# Patient Record
Sex: FEMALE | Race: WHITE | NOT HISPANIC OR LATINO | URBAN - METROPOLITAN AREA
[De-identification: names, ages, dates, MRNs, and addresses within clinical notes are randomized per-mention and may not be internally consistent; named-entity substitution may affect disease eponyms.]

---

## 2023-12-15 ENCOUNTER — EMERGENCY (EMERGENCY)
Facility: HOSPITAL | Age: 28
LOS: 1 days | Discharge: HOME | End: 2023-12-15
Admitting: STUDENT IN AN ORGANIZED HEALTH CARE EDUCATION/TRAINING PROGRAM
Payer: COMMERCIAL

## 2023-12-15 VITALS
HEART RATE: 93 BPM | RESPIRATION RATE: 20 BRPM | SYSTOLIC BLOOD PRESSURE: 133 MMHG | DIASTOLIC BLOOD PRESSURE: 87 MMHG | OXYGEN SATURATION: 98 % | TEMPERATURE: 98 F
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
## 2023-12-15 DIAGNOSIS — S81.811A LACERATION WITHOUT FOREIGN BODY, RIGHT LOWER LEG, INITIAL ENCOUNTER: ICD-10-CM

## 2023-12-15 PROCEDURE — 12004 RPR S/N/AX/GEN/TRK7.6-12.5CM: CPT

## 2023-12-15 PROCEDURE — 99284 EMERGENCY DEPT VISIT MOD MDM: CPT | Mod: 25

## 2023-12-15 PROCEDURE — 96374 THER/PROPH/DIAG INJ IV PUSH: CPT | Mod: XU

## 2023-12-15 RX ORDER — CEPHALEXIN 500 MG
1 CAPSULE ORAL
Qty: 20 | Refills: 0
Start: 2023-12-15 | End: 2023-12-24

## 2023-12-15 RX ORDER — AMPICILLIN SODIUM AND SULBACTAM SODIUM 250; 125 MG/ML; MG/ML
3 INJECTION, POWDER, FOR SUSPENSION INTRAMUSCULAR; INTRAVENOUS ONCE
Refills: 0 | Status: COMPLETED | OUTPATIENT
Start: 2023-12-15 | End: 2023-12-15

## 2023-12-15 RX ADMIN — AMPICILLIN SODIUM AND SULBACTAM SODIUM 200 GRAM(S): 250; 125 INJECTION, POWDER, FOR SUSPENSION INTRAMUSCULAR; INTRAVENOUS at 06:22

## 2023-12-15 NOTE — ED PROVIDER NOTE - PATIENT PORTAL LINK FT
You can access the FollowMyHealth Patient Portal offered by [facility] by registering at the following website: [email or web address]. By joining [employer]’s FollowMyHealth portal, you will also be able to view your health information using other applications (apps) compatible with our system. You can access the FollowMyHealth Patient Portal offered by [facility] by registering at the following website: [email or web address]. By joining [employer]’s FollowMyHealth portal, you will also be able to view your health information using other applications (apps) compatible with our system.

## 2023-12-15 NOTE — ED PROVIDER NOTE - SKIN, MLM
Skin normal color for race, warm, dry and intact. No evidence of rash.  8 cm deep ;laceration below right knee. No tendon involvement. no foreign body, minimal bleeding+

## 2023-12-15 NOTE — ED PROVIDER NOTE - CLINICAL SUMMARY MEDICAL DECISION MAKING FREE TEXT BOX
27 yo female, with laceration to her right lower leg- noted deep lac below right knee. Pt accidentally sustained  it after she hit her knee on the table corner. Pt mentioned that at the same area she had laceration in 10/2023. laceration was repaired with sutures, but because she wasn't careful some small area was opened. Tonight new laceration is even larger. Pt denies any other injuries.  plan : lac, repair, abx, d/c home for further out opt f/u for wound check and suture removal

## 2023-12-15 NOTE — ED PROCEDURE NOTE - CPROC ED TIME OUT STATEMENT1
“Patient's name, , procedure and correct site were confirmed during the [place] Timeout.” “Patient's name, , procedure and correct site were confirmed during the [place] Timeout.”

## 2023-12-15 NOTE — ED ADULT NURSE NOTE - NSFALLUNIVINTERV_ED_ALL_ED
Bed/Stretcher in lowest position, wheels locked, appropriate side rails in place/Call bell, personal items and telephone in reach/Instruct patient to call for assistance before getting out of bed/chair/stretcher/Non-slip footwear applied when patient is off stretcher/Breaks to call system/Physically safe environment - no spills, clutter or unnecessary equipment/Purposeful proactive rounding/Room/bathroom lighting operational, light cord in reach Bed/Stretcher in lowest position, wheels locked, appropriate side rails in place/Call bell, personal items and telephone in reach/Instruct patient to call for assistance before getting out of bed/chair/stretcher/Non-slip footwear applied when patient is off stretcher/[place] to call system/Physically safe environment - no spills, clutter or unnecessary equipment/Purposeful proactive rounding/Room/bathroom lighting operational, light cord in reach

## 2023-12-15 NOTE — ED ADULT NURSE NOTE - OBJECTIVE STATEMENT
Pt presents to ED c/o RLE laceration. Per pt, she has a wound on her right lower leg that was stitched up in October, tonight she hit the leg against her bed and the wound reopened. A&Ox4 Denies pain

## 2023-12-15 NOTE — ED PROVIDER NOTE - NSFOLLOWUPINSTRUCTIONS_ED_ALL_ED_FT
Laceration Care, Adult  A laceration is a cut that may go through all layers of the skin and into the tissue that is right under the skin. Some lacerations heal on their own. Others need to be closed with stitches (sutures), staples, skin adhesive strips, or skin glue.    Proper care of a laceration reduces the risk for infection, helps the laceration heal better, and may prevent scarring.    General tips  Keep the wound clean and dry.  Do not scratch or pick at the wound.  Wash your hands with soap and water for at least 20 seconds before and after touching your wound or changing your bandage (dressing). If soap and water are not available, use hand .  Do not usedisinfectants or antiseptics, such as rubbing alcohol, to clean your wound unless told by your health care provider.  If you were given a dressing, you should change it at least once a day, or as told by your health care provider. You should also change it if it becomes wet or dirty.  How to care for your laceration  If sutures or staples were used:    Keep the wound completely dry for the first 24 hours, or as told by your health care provider. After that time, you may shower or bathe. Do not soak your wound in water until after the sutures or staples have been removed.  Clean the wound once each day, or as told by your health care provider. To do this:  Wash the wound with soap and water.  Rinse the wound with water to remove all soap.  Pat the wound dry with a clean towel. Do not rub the wound.  After cleaning the wound, apply a thin layer of antibiotic ointment, other topical ointments, or a non-adherent dressing as told by your health care provider. This will help prevent infection and keep the dressing from sticking to the wound.  Have the sutures or staples removed as told by your health care provider. Do not remove sutures or staples yourself.  If skin adhesive strips were used:    Do not get the skin adhesive strips wet. You may shower or bathe, but keep the wound dry.  If the wound gets wet, pat it dry with a clean towel. Do not rub the wound.  Skin adhesive strips fall off on their own. If adhesive strip edges start to loosen and curl up, you may trim the loose edges. Do not remove adhesive strips completely unless your health care provider tells you to do that.  If skin glue was used:    You may shower or bathe, but try to keep the wound dry. Do not soak the wound in water.  After showering or bathing, pat the wound dry with a clean towel. Do not rub the wound.  Do not do any activities that will make you sweat a lot until the skin glue has fallen off.  Do not apply liquid, cream, or ointment medicine to the wound while the skin glue is in place. Doing this may loosen the film before the wound has healed.  If a dressing is placed over the wound, do not apply tape directly over the skin glue. Doing this may cause the glue to be pulled off before the wound has healed.  Do not pick at the glue. Skin glue usually remains in place for 5–10 days and then falls off the skin.  Follow these instructions at home:  Medicines    Take over-the-counter and prescription medicines only as told by your health care provider.  If you were prescribed an antibiotic medicine or ointment, take or apply it as told by your health care provider. Do not stop using it even if your condition improves.  Managing pain and swelling    If directed, put ice on the injured area. To do this:  Put ice in a plastic bag.  Place a towel between your skin and the bag.  Leave the ice on for 20 minutes, 2–3 times a day.  Remove the ice if your skin turns bright red. This is very important. If you cannot feel pain, heat, or cold, you have a greater risk of damage to the area.  Raise (elevate) the injured area above the level of your heart while you are sitting or lying down for the first 24–48 hours after the laceration is repaired.  General instructions    Two wounds closed with skin glue. One is normal. The other is red with pus and infected.  Avoid any activity that could cause your wound to reopen.  Check your wound every day for signs of infection. Watch for:  More redness, swelling, or pain.  Fluid or blood.  Warmth.  Pus or a bad smell.  Keep all follow-up visits. This is important.  Contact a health care provider if:  You received a tetanus shot and you have swelling, severe pain, redness, or bleeding at the injection site.  Your closed wound breaks open.  You have any of these signs of infection:  More redness, swelling, or pain around your wound.  Fluid or blood coming from your wound.  Warmth coming from your wound.  Pus or a bad smell coming from your wound.  A fever.  You notice something coming out of the wound, such as wood or glass.  Your pain is not controlled with medicine.  You notice a change in the color of your skin near your wound.  You need to change the dressing often.  You develop a new rash.  You have numbness around the wound.  Get help right away if:  You develop severe swelling around the wound.  Your pain suddenly increases and is severe.  You develop painful lumps near the wound or on skin anywhere else on your body.  You have a red streak going away from your wound.  The wound is on your hand or foot, and you cannot properly move a finger or toe.  The wound is on your hand or foot, and you notice that your fingers or toes look pale or bluish.  Summary  A laceration is a cut that may go through all layers of the skin and into the tissue that is right under the skin.  Some lacerations heal on their own. Others need to be closed with stitches (sutures), staples, skin adhesive strips, or skin glue.  Proper care of a laceration reduces the risk of infection, helps the laceration heal better, and may prevent scarring.  This information is not intended to replace advice given to you by your health care provider. Make sure you discuss any questions you have with your health care provider.    Please keep your wound dry and clean and elevate your injured extremity at rest. Change dressing every day and take antibiotics as recommended.  Follow up with your orthopedist in 48 hours for evaluation.

## 2023-12-15 NOTE — ED PROVIDER NOTE - OBJECTIVE STATEMENT
29 yo female in the Er with laceration to her right lower leg. Noted wound just below her right knee with some bleeding. Pt mentioned she had similar laceration at the same area in 10/2023. Laceration was repaired with sutures. Now its a new wound after she hit her knee on the corner of table. Pt reports her tetanus is UTD.

## 2023-12-15 NOTE — ED ADULT NURSE NOTE - CHIEF COMPLAINT QUOTE
Pt presents to the ED with complaints of right lower leg pain. As per pt, she has a wound on her right lower leg that was stitched up in October, tonight she hit the leg against her bed and the wound reopened. Laceration noted to right lower leg.

## 2024-05-03 NOTE — ED ADULT NURSE NOTE - CHIEF COMPLAINT
-- DO NOT REPLY / DO NOT REPLY ALL --  -- Message is from [facility] [employer] ([facility]) --    ONLY TO BE USED WITHIN A REFILL MEDICATION ENCOUNTER    Med Refill  Is the patient currently having any symptoms?: No/Non-Emergent symptoms    Name of medication requested: See pended med    Is this the first request for the medication in the last 48 hours?: Yes    Patient is requesting a medication refill - medication is on active medication list    Patient is currently OUT of the requested medication - sent as HIGH priority    Full name of the provider who ordered the medication: Jessica WILL, [facility] site name / Account # for provider: [facility] [address]     Preferred Pharmacy: Pharmacy  [id number] - [address]    Patient confirmed the above pharmacy as correct?  Yes    Caller Information         Type Contact Phone/Fax    05/03/2024 09:28 AM CDT Phone (Incoming) Valentine Conrad (Self) [phone] (M)            Alternative phone number: No    Can a detailed message be left?: Yes        
Medication: zolmitriptan (ZOMIG) 2.5 MG tablet  passed protocol.   Last office visit date: 05/01/2024  Next appointment scheduled?: No;    Number of refills given: 1      [facility] text sent and rx sent to pharmacy   
The patient is a 28y Female complaining of lacerations.